# Patient Record
Sex: FEMALE | Race: WHITE | NOT HISPANIC OR LATINO | URBAN - METROPOLITAN AREA
[De-identification: names, ages, dates, MRNs, and addresses within clinical notes are randomized per-mention and may not be internally consistent; named-entity substitution may affect disease eponyms.]

---

## 2020-08-27 ENCOUNTER — OFFICE VISIT (OUTPATIENT)
Dept: URBAN - METROPOLITAN AREA CLINIC 54 | Facility: CLINIC | Age: 25
End: 2020-08-27
Payer: COMMERCIAL

## 2020-08-27 ENCOUNTER — DASHBOARD ENCOUNTERS (OUTPATIENT)
Age: 25
End: 2020-08-27

## 2020-08-27 DIAGNOSIS — R19.4 CHANGE IN BOWEL HABITS: ICD-10-CM

## 2020-08-27 DIAGNOSIS — Z72.0 TOBACCO ABUSE: ICD-10-CM

## 2020-08-27 DIAGNOSIS — R19.7 DIARRHEA: ICD-10-CM

## 2020-08-27 DIAGNOSIS — F12.10 CANNABIS ABUSE: ICD-10-CM

## 2020-08-27 DIAGNOSIS — R10.9 ABDOMINAL PAIN: ICD-10-CM

## 2020-08-27 PROCEDURE — 99203 OFFICE O/P NEW LOW 30 MIN: CPT | Performed by: INTERNAL MEDICINE

## 2020-08-27 NOTE — HPI-TODAY'S VISIT:
Patient is a 24 yo woman referred for abdominal complaints. She c/o abdominal pain, diarrhea, nausea, heartburn x 2 months. She reports having longstanding GI issues since childhood. Previously she reports constipation for several years. Pain is intermittent, left sided, worse by any kind of food. No effect with BM's.  She has loose watery stools 2 times/day with nocturnal symptoms. No rectal bleedings, fever, chills, weight loss. No recent exposure to antibiotics, travel, sick contacts or intake of unusual substances. Imodium constipates her. She takes probiotics OTC via breakfast shake. No known food allergies or intolerances. No family history of IBD. Recent blood work, H.Pylori breath testing negative. RUQ USG is pending. She smokes 1/2 joint of MJ every night.

## 2020-08-29 LAB
C-REACTIVE PROTEIN, QUANT: 2
SEDIMENTATION RATE-WESTERGREN: 2
T-TRANSGLUTAMINASE (TTG) IGA: <2
T-TRANSGLUTAMINASE (TTG) IGG: <2